# Patient Record
Sex: MALE | Race: BLACK OR AFRICAN AMERICAN | NOT HISPANIC OR LATINO | Employment: UNEMPLOYED | URBAN - METROPOLITAN AREA
[De-identification: names, ages, dates, MRNs, and addresses within clinical notes are randomized per-mention and may not be internally consistent; named-entity substitution may affect disease eponyms.]

---

## 2022-02-24 ENCOUNTER — HISTORICAL (OUTPATIENT)
Dept: ADMINISTRATIVE | Facility: HOSPITAL | Age: 22
End: 2022-02-24

## 2022-09-04 ENCOUNTER — OFFICE VISIT (OUTPATIENT)
Dept: URGENT CARE | Facility: CLINIC | Age: 22
End: 2022-09-04
Payer: MEDICAID

## 2022-09-04 VITALS
WEIGHT: 135.38 LBS | HEIGHT: 69 IN | RESPIRATION RATE: 18 BRPM | DIASTOLIC BLOOD PRESSURE: 56 MMHG | HEART RATE: 59 BPM | TEMPERATURE: 99 F | SYSTOLIC BLOOD PRESSURE: 102 MMHG | BODY MASS INDEX: 20.05 KG/M2 | OXYGEN SATURATION: 97 %

## 2022-09-04 DIAGNOSIS — Z20.2 EXPOSURE TO CHLAMYDIA: Primary | ICD-10-CM

## 2022-09-04 LAB
C TRACH DNA SPEC QL NAA+PROBE: DETECTED
HIV 1+2 AB+HIV1 P24 AG SERPL QL IA: NONREACTIVE
N GONORRHOEA DNA SPEC QL NAA+PROBE: NOT DETECTED
T PALLIDUM AB SER QL: NONREACTIVE

## 2022-09-04 PROCEDURE — 87591 N.GONORRHOEAE DNA AMP PROB: CPT | Performed by: FAMILY MEDICINE

## 2022-09-04 PROCEDURE — 36415 COLL VENOUS BLD VENIPUNCTURE: CPT | Performed by: FAMILY MEDICINE

## 2022-09-04 PROCEDURE — 86780 TREPONEMA PALLIDUM: CPT | Performed by: FAMILY MEDICINE

## 2022-09-04 PROCEDURE — 99214 PR OFFICE/OUTPT VISIT, EST, LEVL IV, 30-39 MIN: ICD-10-PCS | Mod: S$PBB,,, | Performed by: FAMILY MEDICINE

## 2022-09-04 PROCEDURE — 99214 OFFICE O/P EST MOD 30 MIN: CPT | Mod: S$PBB,,, | Performed by: FAMILY MEDICINE

## 2022-09-04 PROCEDURE — 87389 HIV-1 AG W/HIV-1&-2 AB AG IA: CPT | Performed by: FAMILY MEDICINE

## 2022-09-04 PROCEDURE — 99213 OFFICE O/P EST LOW 20 MIN: CPT | Mod: PBBFAC | Performed by: FAMILY MEDICINE

## 2022-09-04 PROCEDURE — 87491 CHLMYD TRACH DNA AMP PROBE: CPT | Performed by: FAMILY MEDICINE

## 2022-09-04 RX ORDER — AZITHROMYCIN 500 MG/1
500 TABLET, FILM COATED ORAL ONCE
Qty: 2 TABLET | Refills: 0 | Status: SHIPPED | OUTPATIENT
Start: 2022-09-04 | End: 2022-09-04

## 2022-09-04 NOTE — PROGRESS NOTES
"Subjective:       Patient ID: Sumaya Stack is a 22 y.o. male.    Chief Complaint: Exposure to STD (Patient denies symptoms)      HPI  21yo male with exposure to chlamydia.  Denies discharge, dysuria, pelvic pain, or other symptoms.  He would like to be tested for HIV and syphilis as well.  Partner was treated.   Review of Systems   Genitourinary:         As above       Objective:       Vital Signs  Temp: 98.6 °F (37 °C)  Temp src: Oral  Pulse: (!) 59  Resp: 18  SpO2: 97 %  BP: (!) 102/56  Height and Weight  Height: 5' 9" (175.3 cm)  Weight: 61.4 kg (135 lb 6.4 oz)  BSA (Calculated - sq m): 1.73 sq meters  BMI (Calculated): 20  Weight in (lb) to have BMI = 25: 168.9]  Physical Exam  Vitals reviewed.   Constitutional:       Appearance: Normal appearance.   HENT:      Head: Normocephalic and atraumatic.   Eyes:      Extraocular Movements: Extraocular movements intact.      Conjunctiva/sclera: Conjunctivae normal.   Cardiovascular:      Rate and Rhythm: Regular rhythm.      Heart sounds: Normal heart sounds.   Pulmonary:      Breath sounds: Normal breath sounds.   Skin:     General: Skin is warm and dry.   Neurological:      General: No focal deficit present.      Mental Status: He is alert.   Psychiatric:         Mood and Affect: Mood normal.         Behavior: Behavior normal.       Assessment:       Problem List Items Addressed This Visit    None  Visit Diagnoses       Exposure to chlamydia    -  Primary    Relevant Medications    azithromycin (ZITHROMAX) 500 MG tablet    Other Relevant Orders    Chlamydia/GC, PCR    HIV 1/2 Ag/Ab (4th Gen)    SYPHILIS ANTIBODY (WITH REFLEX RPR)              Plan:           ER precautions  Safe sex precautions  FU with PCP  "

## 2022-09-05 ENCOUNTER — TELEPHONE (OUTPATIENT)
Dept: URGENT CARE | Facility: CLINIC | Age: 22
End: 2022-09-05
Payer: MEDICAID

## 2022-09-05 NOTE — TELEPHONE ENCOUNTER
----- Message from Pipo Tolentino MD sent at 9/5/2022  8:55 AM CDT -----  Please notify patient that chlamydia test was positive.  Take antibiotics as prescribed during office encounter.  Notify partners to get tested/treated.  Avoid sexual activity for at least 7 days after completing treatment.

## 2022-09-08 ENCOUNTER — TELEPHONE (OUTPATIENT)
Dept: URGENT CARE | Facility: CLINIC | Age: 22
End: 2022-09-08
Payer: MEDICAID

## 2022-09-29 ENCOUNTER — OFFICE VISIT (OUTPATIENT)
Dept: URGENT CARE | Facility: CLINIC | Age: 22
End: 2022-09-29
Payer: MEDICAID

## 2022-09-29 VITALS
BODY MASS INDEX: 19.42 KG/M2 | RESPIRATION RATE: 18 BRPM | HEIGHT: 70 IN | TEMPERATURE: 98 F | OXYGEN SATURATION: 96 % | SYSTOLIC BLOOD PRESSURE: 97 MMHG | DIASTOLIC BLOOD PRESSURE: 61 MMHG | WEIGHT: 135.63 LBS | HEART RATE: 74 BPM

## 2022-09-29 DIAGNOSIS — Z20.2 EXPOSURE TO CHLAMYDIA: ICD-10-CM

## 2022-09-29 DIAGNOSIS — B35.6 TINEA CRURIS: Primary | ICD-10-CM

## 2022-09-29 LAB
C TRACH DNA SPEC QL NAA+PROBE: NOT DETECTED
N GONORRHOEA DNA SPEC QL NAA+PROBE: NOT DETECTED

## 2022-09-29 PROCEDURE — 99214 OFFICE O/P EST MOD 30 MIN: CPT | Mod: PBBFAC | Performed by: FAMILY MEDICINE

## 2022-09-29 PROCEDURE — 87491 CHLMYD TRACH DNA AMP PROBE: CPT | Performed by: FAMILY MEDICINE

## 2022-09-29 PROCEDURE — 87591 N.GONORRHOEAE DNA AMP PROB: CPT | Performed by: FAMILY MEDICINE

## 2022-09-29 PROCEDURE — 99214 PR OFFICE/OUTPT VISIT, EST, LEVL IV, 30-39 MIN: ICD-10-PCS | Mod: S$PBB,,, | Performed by: FAMILY MEDICINE

## 2022-09-29 PROCEDURE — 99214 OFFICE O/P EST MOD 30 MIN: CPT | Mod: S$PBB,,, | Performed by: FAMILY MEDICINE

## 2022-09-29 RX ORDER — DOXYCYCLINE HYCLATE 100 MG
100 TABLET ORAL 2 TIMES DAILY
Qty: 14 TABLET | Refills: 0 | Status: SHIPPED | OUTPATIENT
Start: 2022-09-29 | End: 2022-10-06

## 2022-09-29 RX ORDER — DOXYLAMINE SUCCINATE 25 MG
TABLET ORAL 2 TIMES DAILY
Qty: 42.5 G | Refills: 1 | Status: SHIPPED | OUTPATIENT
Start: 2022-09-29

## 2022-09-29 NOTE — PROGRESS NOTES
"Subjective:       Patient ID: Sumaya Stack is a 22 y.o. male.    Vitals:  height is 5' 10.08" (1.78 m) and weight is 61.5 kg (135 lb 9.6 oz). His oral temperature is 98.4 °F (36.9 °C). His blood pressure is 97/61 and his pulse is 74. His respiration is 18 and oxygen saturation is 96%.     Chief Complaint: Exposure to STD (Reports itching and rash, exposed to chlamydia)    HPI  22-year-old male with recent chlamydia, finished doxycycline, but states he thinks he may have been re-exposed by his partner.  Would like to be treated again.  Also has a rash in the groin for several weeks, pruritic.  Denies penile discharge or testicular discomfort.  No dysuria.  No sore throat or joint pains.  ROS    Constitutional: negative except as stated in HPI  Eye: negative except as stated in HPI  ENT: negative except as stated in HPI  Respiratory: negative except as stated in HPI  Cardiovascular: negative except as stated in HPI  Gastrointestinal: negative except as stated in HPI  Genitourinary: negative except as stated in HPI  Objective:      Physical Exam    VITAL SIGNS:  Reviewed.      GENERAL:  In no apparent distress  HEAD:  No signs of head trauma  EYES:  Pupils are equal.  Extraocular motions intact    ABDOMEN: Soft, nontender  :  Scattered eczematous patches on perineum, no testicular tenderness or nodules, no penile discharge, no inguinal lymphadenopathy  PSYCHIATRIC:  Cooperative, appropriate mood and affect  Assessment:       1. Tinea cruris    2. Exposure to chlamydia            Plan:         Tinea cruris    Exposure to chlamydia  -     Chlamydia/GC, PCR    Other orders  -     doxycycline (VIBRA-TABS) 100 MG tablet; Take 1 tablet (100 mg total) by mouth 2 (two) times daily. for 7 days  Dispense: 14 tablet; Refill: 0  -     miconazole (MICOTIN) 2 % cream; Apply topically 2 (two) times daily.  Dispense: 42.5 g; Refill: 1         Treat as potential tinea cruris.  Miconazole.  Give another course of doxycycline " secondary to likely re exposure.  Send urine for GC and chlamydia testing and notify if indicated.  Discussed safe sex practices